# Patient Record
Sex: MALE | Race: WHITE
[De-identification: names, ages, dates, MRNs, and addresses within clinical notes are randomized per-mention and may not be internally consistent; named-entity substitution may affect disease eponyms.]

---

## 2018-04-13 ENCOUNTER — HOSPITAL ENCOUNTER (EMERGENCY)
Dept: HOSPITAL 17 - NEPJ | Age: 33
LOS: 1 days | Discharge: HOME | End: 2018-04-14
Payer: COMMERCIAL

## 2018-04-13 VITALS — HEIGHT: 71 IN | BODY MASS INDEX: 24.69 KG/M2 | WEIGHT: 176.37 LBS

## 2018-04-13 VITALS — TEMPERATURE: 99.1 F | OXYGEN SATURATION: 96 % | DIASTOLIC BLOOD PRESSURE: 83 MMHG | SYSTOLIC BLOOD PRESSURE: 126 MMHG

## 2018-04-13 VITALS
DIASTOLIC BLOOD PRESSURE: 85 MMHG | OXYGEN SATURATION: 97 % | TEMPERATURE: 98.4 F | RESPIRATION RATE: 20 BRPM | SYSTOLIC BLOOD PRESSURE: 137 MMHG | HEART RATE: 102 BPM

## 2018-04-13 VITALS
TEMPERATURE: 98.9 F | DIASTOLIC BLOOD PRESSURE: 71 MMHG | OXYGEN SATURATION: 98 % | HEART RATE: 75 BPM | SYSTOLIC BLOOD PRESSURE: 128 MMHG | RESPIRATION RATE: 18 BRPM

## 2018-04-13 DIAGNOSIS — F43.24: ICD-10-CM

## 2018-04-13 DIAGNOSIS — Z72.0: ICD-10-CM

## 2018-04-13 DIAGNOSIS — Z02.89: Primary | ICD-10-CM

## 2018-04-13 LAB
ALBUMIN SERPL-MCNC: 4.5 GM/DL (ref 3.4–5)
ALP SERPL-CCNC: 79 U/L (ref 45–117)
ALT SERPL-CCNC: 26 U/L (ref 12–78)
AST SERPL-CCNC: 27 U/L (ref 15–37)
BASOPHILS # BLD AUTO: 0.1 TH/MM3 (ref 0–0.2)
BASOPHILS NFR BLD: 0.7 % (ref 0–2)
BILIRUB SERPL-MCNC: 0.3 MG/DL (ref 0.2–1)
BUN SERPL-MCNC: 13 MG/DL (ref 7–18)
CALCIUM SERPL-MCNC: 9.8 MG/DL (ref 8.5–10.1)
CHLORIDE SERPL-SCNC: 110 MEQ/L (ref 98–107)
CREAT SERPL-MCNC: 1.09 MG/DL (ref 0.6–1.3)
EOSINOPHIL # BLD: 0 TH/MM3 (ref 0–0.4)
EOSINOPHIL NFR BLD: 0.4 % (ref 0–4)
ERYTHROCYTE [DISTWIDTH] IN BLOOD BY AUTOMATED COUNT: 14 % (ref 11.6–17.2)
GFR SERPLBLD BASED ON 1.73 SQ M-ARVRAT: 78 ML/MIN (ref 89–?)
GLUCOSE SERPL-MCNC: 94 MG/DL (ref 74–106)
HCO3 BLD-SCNC: 22.4 MEQ/L (ref 21–32)
HCT VFR BLD CALC: 46.2 % (ref 39–51)
HGB BLD-MCNC: 15.7 GM/DL (ref 13–17)
LYMPHOCYTES # BLD AUTO: 1.3 TH/MM3 (ref 1–4.8)
LYMPHOCYTES NFR BLD AUTO: 15.3 % (ref 9–44)
MCH RBC QN AUTO: 31.2 PG (ref 27–34)
MCHC RBC AUTO-ENTMCNC: 34 % (ref 32–36)
MCV RBC AUTO: 91.7 FL (ref 80–100)
MONOCYTE #: 0.5 TH/MM3 (ref 0–0.9)
MONOCYTES NFR BLD: 5.4 % (ref 0–8)
NEUTROPHILS # BLD AUTO: 6.6 TH/MM3 (ref 1.8–7.7)
NEUTROPHILS NFR BLD AUTO: 78.2 % (ref 16–70)
PLATELET # BLD: 295 TH/MM3 (ref 150–450)
PMV BLD AUTO: 7.5 FL (ref 7–11)
PROT SERPL-MCNC: 8.2 GM/DL (ref 6.4–8.2)
RBC # BLD AUTO: 5.04 MIL/MM3 (ref 4.5–5.9)
SODIUM SERPL-SCNC: 143 MEQ/L (ref 136–145)
WBC # BLD AUTO: 8.4 TH/MM3 (ref 4–11)

## 2018-04-13 PROCEDURE — 85025 COMPLETE CBC W/AUTO DIFF WBC: CPT

## 2018-04-13 PROCEDURE — 80053 COMPREHEN METABOLIC PANEL: CPT

## 2018-04-13 PROCEDURE — 99284 EMERGENCY DEPT VISIT MOD MDM: CPT

## 2018-04-13 PROCEDURE — 80307 DRUG TEST PRSMV CHEM ANLYZR: CPT

## 2018-04-13 PROCEDURE — 84443 ASSAY THYROID STIM HORMONE: CPT

## 2018-04-14 VITALS
SYSTOLIC BLOOD PRESSURE: 137 MMHG | RESPIRATION RATE: 18 BRPM | DIASTOLIC BLOOD PRESSURE: 79 MMHG | OXYGEN SATURATION: 96 % | TEMPERATURE: 98.7 F | HEART RATE: 72 BPM

## 2018-04-14 NOTE — PD
Data


Data


Last Documented VS





Vital Signs








  Date Time  Temp Pulse Resp B/P (MAP) Pulse Ox O2 Delivery O2 Flow Rate FiO2


 


4/14/18 07:08 98.7 72 18 137/79 (98) 96 Room Air  








Orders





 Orders


Complete Blood Count With Diff (4/13/18 19:37)


Comprehensive Metabolic Panel (4/13/18 19:37)


Thyroid Stimulating Hormone (4/13/18 19:37)


Psych Screen (4/13/18 19:37)


Drug Screen, Random Urine (4/13/18 19:37)


Alcohol (Ethanol) (4/13/18 19:37)


Diet Regular Basic (4/14/18 Breakfast)





Labs





Laboratory Tests








Test


  4/13/18


19:40 4/13/18


21:13


 


White Blood Count 8.4 TH/MM3  


 


Red Blood Count 5.04 MIL/MM3  


 


Hemoglobin 15.7 GM/DL  


 


Hematocrit 46.2 %  


 


Mean Corpuscular Volume 91.7 FL  


 


Mean Corpuscular Hemoglobin 31.2 PG  


 


Mean Corpuscular Hemoglobin


Concent 34.0 % 


  


 


 


Red Cell Distribution Width 14.0 %  


 


Platelet Count 295 TH/MM3  


 


Mean Platelet Volume 7.5 FL  


 


Neutrophils (%) (Auto) 78.2 %  


 


Lymphocytes (%) (Auto) 15.3 %  


 


Monocytes (%) (Auto) 5.4 %  


 


Eosinophils (%) (Auto) 0.4 %  


 


Basophils (%) (Auto) 0.7 %  


 


Neutrophils # (Auto) 6.6 TH/MM3  


 


Lymphocytes # (Auto) 1.3 TH/MM3  


 


Monocytes # (Auto) 0.5 TH/MM3  


 


Eosinophils # (Auto) 0.0 TH/MM3  


 


Basophils # (Auto) 0.1 TH/MM3  


 


CBC Comment DIFF FINAL  


 


Differential Comment   


 


Blood Urea Nitrogen 13 MG/DL  


 


Creatinine 1.09 MG/DL  


 


Random Glucose 94 MG/DL  


 


Total Protein 8.2 GM/DL  


 


Albumin 4.5 GM/DL  


 


Calcium Level 9.8 MG/DL  


 


Alkaline Phosphatase 79 U/L  


 


Aspartate Amino Transf


(AST/SGOT) 27 U/L 


  


 


 


Alanine Aminotransferase


(ALT/SGPT) 26 U/L 


  


 


 


Total Bilirubin 0.3 MG/DL  


 


Sodium Level 143 MEQ/L  


 


Potassium Level 4.1 MEQ/L  


 


Chloride Level 110 MEQ/L  


 


Carbon Dioxide Level 22.4 MEQ/L  


 


Anion Gap 11 MEQ/L  


 


Estimat Glomerular Filtration


Rate 78 ML/MIN 


  


 


 


Thyroid Stimulating Hormone


3rd Gen 1.300 uIU/ML 


  


 


 


Ethyl Alcohol Level 102 MG/DL  


 


Urine Opiates Screen  NEG 


 


Urine Barbiturates Screen  NEG 


 


Urine Amphetamines Screen  NEG 


 


Urine Benzodiazepines Screen  NEG 


 


Urine Cocaine Screen  NEG 


 


Urine Cannabinoids Screen  NEG 











MDM


Medical Record Reviewed:  Yes


Supervised Visit with ED:  No


Narrative Course


See previous providers notes for complete history of present illness.  This 

patient has been seen by psychiatrist Dr. Powell and cleared by the 

psychiatry department.  There is no medical issue that would warrant additional 

hospitalization.  He is stable for discharge.


Diagnosis





 Primary Impression:  


 Medical clearance for psychiatric admission


***Med/Other Pt SpecificInfo:  No Change to Meds


Scripts


No Active Prescriptions or Reported Meds


Disposition:  01 DISCHARGE HOME


Condition:  Stable











Ernesto Engel Apr 14, 2018 08:18
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
